# Patient Record
Sex: MALE | Race: WHITE | NOT HISPANIC OR LATINO | URBAN - METROPOLITAN AREA
[De-identification: names, ages, dates, MRNs, and addresses within clinical notes are randomized per-mention and may not be internally consistent; named-entity substitution may affect disease eponyms.]

---

## 2017-02-07 ENCOUNTER — NEW PATIENT (OUTPATIENT)
Dept: URBAN - METROPOLITAN AREA CLINIC 85 | Facility: CLINIC | Age: 77
End: 2017-02-07
Payer: MEDICARE

## 2017-02-07 DIAGNOSIS — H02.132 SENILE ECTROPION OF RIGHT LOWER LID: Primary | ICD-10-CM

## 2017-02-07 DIAGNOSIS — H02.135 SENILE ECTROPION OF LEFT LOWER LID: ICD-10-CM

## 2017-02-07 PROCEDURE — 92083 EXTENDED VISUAL FIELD XM: CPT | Performed by: OPHTHALMOLOGY

## 2017-02-07 PROCEDURE — 92285 EXTERNAL OCULAR PHOTOGRAPHY: CPT | Performed by: OPHTHALMOLOGY

## 2017-02-07 PROCEDURE — 92002 INTRM OPH EXAM NEW PATIENT: CPT | Performed by: OPHTHALMOLOGY

## 2017-02-07 RX ORDER — ERYTHROMYCIN 5 MG/G
OINTMENT OPHTHALMIC
Qty: 1 | Refills: 0 | Status: INACTIVE
Start: 2017-02-07 | End: 2018-04-23

## 2017-02-07 ASSESSMENT — INTRAOCULAR PRESSURE
OS: 14
OD: 14

## 2017-02-27 ENCOUNTER — SURGERY (OUTPATIENT)
Dept: URBAN - METROPOLITAN AREA SURGERY 55 | Facility: SURGERY | Age: 77
End: 2017-02-27
Payer: MEDICARE

## 2017-02-28 ENCOUNTER — POST OP (OUTPATIENT)
Dept: URBAN - METROPOLITAN AREA CLINIC 85 | Facility: CLINIC | Age: 77
End: 2017-02-28
Payer: MEDICARE

## 2017-02-28 DIAGNOSIS — Z48.817 ENCNTR FOR SURGICAL AFTCR FOL SURGERY ON THE SKIN, SUBCU: Primary | ICD-10-CM

## 2017-02-28 PROCEDURE — 99024 POSTOP FOLLOW-UP VISIT: CPT | Performed by: OPHTHALMOLOGY

## 2017-03-14 ENCOUNTER — POST OP (OUTPATIENT)
Dept: URBAN - METROPOLITAN AREA CLINIC 85 | Facility: CLINIC | Age: 77
End: 2017-03-14

## 2017-03-14 PROCEDURE — 99024 POSTOP FOLLOW-UP VISIT: CPT | Performed by: OPHTHALMOLOGY

## 2018-04-23 ENCOUNTER — FOLLOW UP ESTABLISHED (OUTPATIENT)
Dept: URBAN - METROPOLITAN AREA CLINIC 85 | Facility: CLINIC | Age: 78
End: 2018-04-23
Payer: MEDICARE

## 2018-04-23 DIAGNOSIS — H10.13 ACUTE ATOPIC CONJUNCTIVITIS, BILATERAL: Primary | ICD-10-CM

## 2018-04-23 PROCEDURE — 92014 COMPRE OPH EXAM EST PT 1/>: CPT | Performed by: OPTOMETRIST

## 2018-04-23 ASSESSMENT — INTRAOCULAR PRESSURE
OD: 14
OS: 14

## 2018-04-23 ASSESSMENT — VISUAL ACUITY
OD: 20/20
OS: 20/20

## 2019-06-25 ENCOUNTER — FOLLOW UP ESTABLISHED (OUTPATIENT)
Dept: URBAN - METROPOLITAN AREA CLINIC 85 | Facility: CLINIC | Age: 79
End: 2019-06-25
Payer: MEDICARE

## 2019-06-25 DIAGNOSIS — H26.493 OTHER SECONDARY CATARACT, BILATERAL: ICD-10-CM

## 2019-06-25 DIAGNOSIS — H17.821 PERIPHERAL OPACITY OF CORNEA OF RIGHT EYE: Primary | ICD-10-CM

## 2019-06-25 PROCEDURE — 92014 COMPRE OPH EXAM EST PT 1/>: CPT | Performed by: OPTOMETRIST

## 2019-06-25 ASSESSMENT — VISUAL ACUITY
OD: 20/20
OS: 20/20

## 2019-06-25 ASSESSMENT — INTRAOCULAR PRESSURE
OS: 15
OD: 14

## 2020-10-02 ENCOUNTER — FOLLOW UP ESTABLISHED (OUTPATIENT)
Dept: URBAN - METROPOLITAN AREA CLINIC 85 | Facility: CLINIC | Age: 80
End: 2020-10-02
Payer: MEDICARE

## 2020-10-02 DIAGNOSIS — H43.393 OTHER VITREOUS OPACITIES, BILATERAL: Primary | ICD-10-CM

## 2020-10-02 PROCEDURE — 92014 COMPRE OPH EXAM EST PT 1/>: CPT | Performed by: OPTOMETRIST

## 2020-10-02 ASSESSMENT — INTRAOCULAR PRESSURE
OS: 15
OD: 16

## 2020-10-02 ASSESSMENT — VISUAL ACUITY
OD: 20/20
OS: 20/15

## 2020-10-02 ASSESSMENT — KERATOMETRY
OD: 43.38
OS: 43.38

## 2023-09-19 ENCOUNTER — APPOINTMENT (RX ONLY)
Dept: URBAN - METROPOLITAN AREA CLINIC 129 | Facility: CLINIC | Age: 83
Setting detail: DERMATOLOGY
End: 2023-09-19

## 2023-09-19 DIAGNOSIS — L73.8 OTHER SPECIFIED FOLLICULAR DISORDERS: ICD-10-CM

## 2023-09-19 DIAGNOSIS — Q80.8 OTHER CONGENITAL ICHTHYOSIS: ICD-10-CM

## 2023-09-19 DIAGNOSIS — L57.0 ACTINIC KERATOSIS: ICD-10-CM

## 2023-09-19 DIAGNOSIS — R20.2 PARESTHESIA OF SKIN: ICD-10-CM

## 2023-09-19 DIAGNOSIS — L82.1 OTHER SEBORRHEIC KERATOSIS: ICD-10-CM

## 2023-09-19 PROBLEM — D48.5 NEOPLASM OF UNCERTAIN BEHAVIOR OF SKIN: Status: ACTIVE | Noted: 2023-09-19

## 2023-09-19 PROCEDURE — 99204 OFFICE O/P NEW MOD 45 MIN: CPT | Mod: 25

## 2023-09-19 PROCEDURE — ? PRESCRIPTION

## 2023-09-19 PROCEDURE — 17000 DESTRUCT PREMALG LESION: CPT

## 2023-09-19 PROCEDURE — ? ADDITIONAL NOTES

## 2023-09-19 PROCEDURE — 17003 DESTRUCT PREMALG LES 2-14: CPT

## 2023-09-19 PROCEDURE — ? LIQUID NITROGEN

## 2023-09-19 PROCEDURE — ? COUNSELING

## 2023-09-19 ASSESSMENT — LOCATION SIMPLE DESCRIPTION DERM
LOCATION SIMPLE: RIGHT NOSE
LOCATION SIMPLE: NOSE
LOCATION SIMPLE: LEFT PRETIBIAL REGION
LOCATION SIMPLE: LEFT FOREARM
LOCATION SIMPLE: RIGHT UPPER BACK
LOCATION SIMPLE: RIGHT FOREARM
LOCATION SIMPLE: LEFT CHEEK
LOCATION SIMPLE: RIGHT PRETIBIAL REGION
LOCATION SIMPLE: UPPER BACK

## 2023-09-19 ASSESSMENT — LOCATION DETAILED DESCRIPTION DERM
LOCATION DETAILED: LEFT SUPERIOR LATERAL MALAR CHEEK
LOCATION DETAILED: RIGHT MID-UPPER BACK
LOCATION DETAILED: LEFT CENTRAL MALAR CHEEK
LOCATION DETAILED: RIGHT DISTAL PRETIBIAL REGION
LOCATION DETAILED: RIGHT VENTRAL PROXIMAL FOREARM
LOCATION DETAILED: LEFT VENTRAL PROXIMAL FOREARM
LOCATION DETAILED: LEFT DISTAL PRETIBIAL REGION
LOCATION DETAILED: LEFT VENTRAL DISTAL FOREARM
LOCATION DETAILED: NASAL DORSUM
LOCATION DETAILED: RIGHT NASAL SIDEWALL
LOCATION DETAILED: SUPERIOR THORACIC SPINE

## 2023-09-19 ASSESSMENT — LOCATION ZONE DERM
LOCATION ZONE: FACE
LOCATION ZONE: ARM
LOCATION ZONE: NOSE
LOCATION ZONE: TRUNK
LOCATION ZONE: LEG

## 2023-09-19 NOTE — PROCEDURE: LIQUID NITROGEN
Detail Level: Zone
Show Applicator Variable?: Yes
Duration Of Freeze Thaw-Cycle (Seconds): 0
Render Note In Bullet Format When Appropriate: No
Post-Care Instructions: I reviewed with the patient in detail post-care instructions. Patient is to wear sunprotection, and avoid picking at any of the treated lesions. Pt may apply Vaseline to crusted or scabbing areas.
Consent: The patient's consent was obtained including but not limited to risks of crusting, scabbing, blistering, scarring, darker or lighter pigmentary change, recurrence, incomplete removal and infection.

## 2023-09-19 NOTE — PROCEDURE: ADDITIONAL NOTES
Render Risk Assessment In Note?: no
Detail Level: Simple
Additional Notes: Recommend OTC propylene glycol.
Additional Notes: Patient will defer biopsy today and would like to monitor

## 2024-05-23 ENCOUNTER — OFFICE VISIT (OUTPATIENT)
Dept: URBAN - METROPOLITAN AREA CLINIC 88 | Facility: CLINIC | Age: 84
End: 2024-05-23
Payer: MEDICARE

## 2024-05-23 DIAGNOSIS — H16.213 EXPOSURE KERATOCONJUNCTIVITIS, BILATERAL: ICD-10-CM

## 2024-05-23 DIAGNOSIS — H02.115 CICATRICIAL ECTROPION OF LEFT LOWER LID: ICD-10-CM

## 2024-05-23 DIAGNOSIS — H02.112 CICATRICIAL ECTROPION OF RIGHT LOWER LID: Primary | ICD-10-CM

## 2024-05-23 DIAGNOSIS — Z96.1 PRESENCE OF PSEUDOPHAKIA: ICD-10-CM

## 2024-05-23 PROCEDURE — 99204 OFFICE O/P NEW MOD 45 MIN: CPT | Performed by: OPTOMETRIST

## 2024-05-23 RX ORDER — NEOMYCIN SULFATE, POLYMYXIN B SULFATE AND DEXAMETHASONE 3.5; 10000; 1 MG/G; [USP'U]/G; MG/G
OINTMENT OPHTHALMIC
Qty: 3.5 | Refills: 1 | Status: ACTIVE
Start: 2024-05-23

## 2024-05-23 ASSESSMENT — INTRAOCULAR PRESSURE
OS: 11
OD: 13

## 2024-05-23 ASSESSMENT — KERATOMETRY
OD: 43.38
OS: 43.13

## 2024-07-25 ENCOUNTER — OFFICE VISIT (OUTPATIENT)
Dept: URBAN - METROPOLITAN AREA CLINIC 88 | Facility: CLINIC | Age: 84
End: 2024-07-25
Payer: MEDICARE

## 2024-07-25 DIAGNOSIS — Z96.1 PRESENCE OF PSEUDOPHAKIA: ICD-10-CM

## 2024-07-25 DIAGNOSIS — H02.112 CICATRICIAL ECTROPION OF RIGHT LOWER LID: Primary | ICD-10-CM

## 2024-07-25 DIAGNOSIS — H02.115 CICATRICIAL ECTROPION OF LEFT LOWER LID: ICD-10-CM

## 2024-07-25 PROCEDURE — 99213 OFFICE O/P EST LOW 20 MIN: CPT | Performed by: OPTOMETRIST

## 2024-07-25 ASSESSMENT — INTRAOCULAR PRESSURE
OD: 12
OS: 14

## 2024-09-12 ENCOUNTER — OFFICE VISIT (OUTPATIENT)
Dept: URBAN - METROPOLITAN AREA CLINIC 6 | Facility: CLINIC | Age: 84
End: 2024-09-12
Payer: MEDICARE

## 2024-09-12 DIAGNOSIS — H02.115 CICATRICIAL ECTROPION OF LEFT LOWER LID: ICD-10-CM

## 2024-09-12 DIAGNOSIS — H02.112 CICATRICIAL ECTROPION OF RIGHT LOWER LID: Primary | ICD-10-CM

## 2024-09-12 PROCEDURE — 99204 OFFICE O/P NEW MOD 45 MIN: CPT | Performed by: OPHTHALMOLOGY

## 2024-09-12 PROCEDURE — 92285 EXTERNAL OCULAR PHOTOGRAPHY: CPT | Performed by: OPHTHALMOLOGY

## 2024-09-12 ASSESSMENT — INTRAOCULAR PRESSURE
OD: 11
OS: 15

## 2025-04-10 ENCOUNTER — POST-OPERATIVE VISIT (OUTPATIENT)
Dept: URBAN - METROPOLITAN AREA CLINIC 6 | Facility: CLINIC | Age: 85
End: 2025-04-10
Payer: MEDICARE

## 2025-04-10 DIAGNOSIS — Z48.810 ENCOUNTER FOR SURGICAL AFTERCARE FOLLOWING SURGERY ON A SENSE ORGAN: Primary | ICD-10-CM

## 2025-04-10 PROCEDURE — 99024 POSTOP FOLLOW-UP VISIT: CPT | Performed by: OPHTHALMOLOGY

## 2025-04-10 ASSESSMENT — INTRAOCULAR PRESSURE
OD: 12
OS: 15